# Patient Record
(demographics unavailable — no encounter records)

---

## 2024-10-09 NOTE — IMAGING
[de-identified] : Right shoulder NTTP at AC joint Full painless shoulder range of motion - Cross body adduction  2 views right shoulder and 2 view right scapula: No acute fractures or malalignment MRI right shoulder (9/19/24): AC joint arthrosis.  No rotator cuff tear.  Joint effusion.

## 2024-10-09 NOTE — HISTORY OF PRESENT ILLNESS
[de-identified] : 10/8/24: The patient returns today for repeat evaluation of his right AC joint pain.  He received a CSI at his last visit and notes more than 95% resolution of his previous pain.  He has been able to return to almost all of his activities with no pain.  He is no longer working with PT.  He has no complaints today however he has many questions regarding his prognosis.  07/11/2024 NISHA ALARCON is a 27 year-old male here today for: right shoulder pain  Location: right shoulder  Complaint: The patient presents to the office today for evaluation of right AC joint pain.  He notes sudden onset of pain beginning roughly 6 weeks ago while doing bench press.  He took a short break from exercise and noted significant improvement in the symptoms.  He returned to his previous activity levels and again noted new onset of pain and difficulty with cross body adduction.  No neck pain, numbness or tingling.   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

## 2024-10-09 NOTE — HISTORY OF PRESENT ILLNESS
[de-identified] : 10/8/24: The patient returns today for repeat evaluation of his right AC joint pain.  He received a CSI at his last visit and notes more than 95% resolution of his previous pain.  He has been able to return to almost all of his activities with no pain.  He is no longer working with PT.  He has no complaints today however he has many questions regarding his prognosis.  07/11/2024 NISHA ALARCON is a 27 year-old male here today for: right shoulder pain  Location: right shoulder  Complaint: The patient presents to the office today for evaluation of right AC joint pain.  He notes sudden onset of pain beginning roughly 6 weeks ago while doing bench press.  He took a short break from exercise and noted significant improvement in the symptoms.  He returned to his previous activity levels and again noted new onset of pain and difficulty with cross body adduction.  No neck pain, numbness or tingling.   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

## 2024-10-09 NOTE — DISCUSSION/SUMMARY
[de-identified] : - reviewed the nature of this injury and prognosis with the patient in layman's terms - again reviewed MRI images and findings in depth - discussed indications for both operative and nonoperative treatment - reviewed conservative treatment options including the role for bracing, anti-inflammatory medications and therapy - reviewed operative procedure including distal clavicle excision and postoperative expectations - reviewed risks, benefits and alternatives to these - activities as tolerated - NSAIDs as needed for pain - f/u as needed

## 2024-10-09 NOTE — IMAGING
[de-identified] : Right shoulder NTTP at AC joint Full painless shoulder range of motion - Cross body adduction  2 views right shoulder and 2 view right scapula: No acute fractures or malalignment MRI right shoulder (9/19/24): AC joint arthrosis.  No rotator cuff tear.  Joint effusion.

## 2024-10-09 NOTE — PHYSICAL EXAM
[Right] : right shoulder [NL (0-180)] : full active abduction 0-180 degrees [NL (0-70)] : full internal rotation 0-70 degrees [NL (0-90)] : full external rotation 0-90 degrees [] : negative Jeronimo

## 2024-10-09 NOTE — DISCUSSION/SUMMARY
[de-identified] : - reviewed the nature of this injury and prognosis with the patient in layman's terms - again reviewed MRI images and findings in depth - discussed indications for both operative and nonoperative treatment - reviewed conservative treatment options including the role for bracing, anti-inflammatory medications and therapy - reviewed operative procedure including distal clavicle excision and postoperative expectations - reviewed risks, benefits and alternatives to these - activities as tolerated - NSAIDs as needed for pain - f/u as needed